# Patient Record
Sex: MALE | Race: WHITE
[De-identification: names, ages, dates, MRNs, and addresses within clinical notes are randomized per-mention and may not be internally consistent; named-entity substitution may affect disease eponyms.]

---

## 2021-06-29 ENCOUNTER — HOSPITAL ENCOUNTER (EMERGENCY)
Dept: HOSPITAL 54 - ER | Age: 66
Discharge: HOME | End: 2021-06-29
Payer: COMMERCIAL

## 2021-06-29 VITALS — HEIGHT: 67 IN | BODY MASS INDEX: 25.11 KG/M2 | WEIGHT: 160 LBS

## 2021-06-29 VITALS — DIASTOLIC BLOOD PRESSURE: 67 MMHG | SYSTOLIC BLOOD PRESSURE: 118 MMHG
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
## 2021-06-29 DIAGNOSIS — M25.511: ICD-10-CM
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
## 2021-06-29 DIAGNOSIS — S80.211A: Primary | ICD-10-CM

## 2021-06-29 DIAGNOSIS — Z85.6: ICD-10-CM
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
## 2021-06-29 DIAGNOSIS — Z79.899: ICD-10-CM

## 2021-06-29 NOTE — NUR
R SHOULDER PAIN S/P FALLING OFF FIRST STEP OF A LADDER AT AROUND 1400. LANDED 
ON THE RIGHT SIDE. DENIES HEAD INJURY. SCRAPE TO R KNEE NOTED. PT AAOX4, VSS. 
RR EVEN & UNLABORED. DENIES ANY OTHER DISCOMFORT. PT SEEN & EVAL'D BY MINDY MARQUEZ. WILL CONT TO MONITOR.

## 2025-04-16 ENCOUNTER — HOSPITAL ENCOUNTER (OUTPATIENT)
Dept: HOSPITAL 12 - DS | Age: 70
Discharge: HOME | End: 2025-04-16
Payer: MEDICARE

## 2025-04-16 VITALS — TEMPERATURE: 96.1 F

## 2025-04-16 DIAGNOSIS — I45.10: ICD-10-CM

## 2025-04-16 DIAGNOSIS — Z79.899: ICD-10-CM

## 2025-04-16 DIAGNOSIS — I25.10: ICD-10-CM

## 2025-04-16 DIAGNOSIS — Z98.890: ICD-10-CM

## 2025-04-16 DIAGNOSIS — Z86.718: ICD-10-CM

## 2025-04-16 DIAGNOSIS — I48.0: Primary | ICD-10-CM

## 2025-04-16 DIAGNOSIS — I10: ICD-10-CM

## 2025-04-16 DIAGNOSIS — Z88.8: ICD-10-CM

## 2025-04-16 DIAGNOSIS — Z79.01: ICD-10-CM

## 2025-04-16 DIAGNOSIS — M19.90: ICD-10-CM

## 2025-04-16 DIAGNOSIS — E03.9: ICD-10-CM

## 2025-04-16 DIAGNOSIS — Z88.6: ICD-10-CM

## 2025-04-16 LAB
BASOPHILS # BLD AUTO: 0.1 K/UL (ref 0–0.2)
BASOPHILS NFR BLD AUTO: 0.7 % (ref 0–2)
CALCIUM SERPL-MCNC: 9.3 MG/DL (ref 8.5–10.1)
EOSINOPHIL # BLD AUTO: 0.1 K/UL (ref 0–0.7)
EOSINOPHIL NFR BLD AUTO: 1.6 % (ref 0–7)
ERYTHROCYTE [DISTWIDTH] IN BLOOD BY AUTOMATED COUNT: 15.3 % (ref 12.1–16.2)
HGB BLD-MCNC: 13.3 G/DL (ref 12.5–16.3)
LYMPHOCYTES # BLD AUTO: 2.1 K/UL (ref 0.8–4.8)
LYMPHOCYTES NFR BLD AUTO: 23.9 % (ref 20.5–51.5)
MANUAL DIF COMMENT BLD-IMP: 1
MCHC RBC AUTO-ENTMCNC: 34 G/DL (ref 32.5–36.3)
MCV RBC AUTO: 87.8 FL (ref 73–96.2)
MONOCYTES # BLD AUTO: 0.7 K/UL (ref 0.1–1.3)
NEUTROPHILS # BLD AUTO: 5.8 K/UL (ref 1.8–8.9)
NEUTROPHILS NFR BLD AUTO: 65.8 % (ref 38.5–71.5)
PLATELET # BLD AUTO: 207 K/UL (ref 152–348)
POTASSIUM SERPL-SCNC: 4.2 MMOL/L (ref 3.5–5.1)
RBC # BLD AUTO: 4.44 MIL/UL (ref 4.06–5.63)
WBC # BLD AUTO: 8.8 K/UL (ref 3.6–10.2)

## 2025-04-16 PROCEDURE — 36415 COLL VENOUS BLD VENIPUNCTURE: CPT

## 2025-04-16 PROCEDURE — 93005 ELECTROCARDIOGRAM TRACING: CPT

## 2025-04-16 PROCEDURE — A4663 DIALYSIS BLOOD PRESSURE CUFF: HCPCS

## 2025-04-16 PROCEDURE — 71045 X-RAY EXAM CHEST 1 VIEW: CPT

## 2025-04-16 PROCEDURE — 85025 COMPLETE CBC W/AUTO DIFF WBC: CPT

## 2025-04-16 PROCEDURE — 85730 THROMBOPLASTIN TIME PARTIAL: CPT

## 2025-04-16 PROCEDURE — 80048 BASIC METABOLIC PNL TOTAL CA: CPT

## 2025-04-16 PROCEDURE — 92960 CARDIOVERSION ELECTRIC EXT: CPT
